# Patient Record
Sex: MALE | Race: WHITE | Employment: FULL TIME | URBAN - METROPOLITAN AREA
[De-identification: names, ages, dates, MRNs, and addresses within clinical notes are randomized per-mention and may not be internally consistent; named-entity substitution may affect disease eponyms.]

---

## 2018-11-03 ENCOUNTER — HOSPITAL ENCOUNTER (EMERGENCY)
Facility: CLINIC | Age: 28
Discharge: HOME | End: 2018-11-04
Attending: EMERGENCY MEDICINE | Admitting: EMERGENCY MEDICINE
Payer: COMMERCIAL

## 2018-11-03 ENCOUNTER — APPOINTMENT (OUTPATIENT)
Dept: GENERAL RADIOLOGY | Facility: CLINIC | Age: 28
End: 2018-11-03
Attending: EMERGENCY MEDICINE
Payer: COMMERCIAL

## 2018-11-03 DIAGNOSIS — I47.19 ATRIAL TACHYCARDIA (H): ICD-10-CM

## 2018-11-03 DIAGNOSIS — E86.0 DEHYDRATION: ICD-10-CM

## 2018-11-03 LAB
ALBUMIN SERPL-MCNC: 4.1 G/DL (ref 3.4–5)
ALP SERPL-CCNC: 105 U/L (ref 40–150)
ALT SERPL W P-5'-P-CCNC: 76 U/L (ref 0–70)
ANION GAP SERPL CALCULATED.3IONS-SCNC: 9 MMOL/L (ref 3–14)
AST SERPL W P-5'-P-CCNC: 54 U/L (ref 0–45)
BASOPHILS # BLD AUTO: 0.1 10E9/L (ref 0–0.2)
BASOPHILS NFR BLD AUTO: 0.7 %
BILIRUB SERPL-MCNC: 0.4 MG/DL (ref 0.2–1.3)
BUN SERPL-MCNC: 13 MG/DL (ref 7–30)
CALCIUM SERPL-MCNC: 9.6 MG/DL (ref 8.5–10.1)
CHLORIDE SERPL-SCNC: 105 MMOL/L (ref 94–109)
CO2 SERPL-SCNC: 24 MMOL/L (ref 20–32)
CREAT SERPL-MCNC: 0.85 MG/DL (ref 0.66–1.25)
DIFFERENTIAL METHOD BLD: NORMAL
EOSINOPHIL # BLD AUTO: 0.2 10E9/L (ref 0–0.7)
EOSINOPHIL NFR BLD AUTO: 2.6 %
ERYTHROCYTE [DISTWIDTH] IN BLOOD BY AUTOMATED COUNT: 13.1 % (ref 10–15)
GFR SERPL CREATININE-BSD FRML MDRD: >90 ML/MIN/1.7M2
GLUCOSE SERPL-MCNC: 149 MG/DL (ref 70–99)
HCT VFR BLD AUTO: 44.7 % (ref 40–53)
HGB BLD-MCNC: 15.3 G/DL (ref 13.3–17.7)
IMM GRANULOCYTES # BLD: 0 10E9/L (ref 0–0.4)
IMM GRANULOCYTES NFR BLD: 0.5 %
LYMPHOCYTES # BLD AUTO: 2.7 10E9/L (ref 0.8–5.3)
LYMPHOCYTES NFR BLD AUTO: 31.6 %
MCH RBC QN AUTO: 30.5 PG (ref 26.5–33)
MCHC RBC AUTO-ENTMCNC: 34.2 G/DL (ref 31.5–36.5)
MCV RBC AUTO: 89 FL (ref 78–100)
MONOCYTES # BLD AUTO: 0.5 10E9/L (ref 0–1.3)
MONOCYTES NFR BLD AUTO: 6 %
NEUTROPHILS # BLD AUTO: 5 10E9/L (ref 1.6–8.3)
NEUTROPHILS NFR BLD AUTO: 58.6 %
NRBC # BLD AUTO: 0 10*3/UL
NRBC BLD AUTO-RTO: 0 /100
PLATELET # BLD AUTO: 246 10E9/L (ref 150–450)
POTASSIUM SERPL-SCNC: 3.6 MMOL/L (ref 3.4–5.3)
PROT SERPL-MCNC: 8.5 G/DL (ref 6.8–8.8)
RBC # BLD AUTO: 5.02 10E12/L (ref 4.4–5.9)
SODIUM SERPL-SCNC: 139 MMOL/L (ref 133–144)
TROPONIN I SERPL-MCNC: <0.015 UG/L (ref 0–0.04)
WBC # BLD AUTO: 8.6 10E9/L (ref 4–11)

## 2018-11-03 PROCEDURE — 93010 ELECTROCARDIOGRAM REPORT: CPT | Mod: Z6 | Performed by: EMERGENCY MEDICINE

## 2018-11-03 PROCEDURE — 84443 ASSAY THYROID STIM HORMONE: CPT | Performed by: EMERGENCY MEDICINE

## 2018-11-03 PROCEDURE — 80053 COMPREHEN METABOLIC PANEL: CPT | Performed by: EMERGENCY MEDICINE

## 2018-11-03 PROCEDURE — 71046 X-RAY EXAM CHEST 2 VIEWS: CPT

## 2018-11-03 PROCEDURE — 99285 EMERGENCY DEPT VISIT HI MDM: CPT | Mod: 25 | Performed by: EMERGENCY MEDICINE

## 2018-11-03 PROCEDURE — 25000128 H RX IP 250 OP 636: Performed by: EMERGENCY MEDICINE

## 2018-11-03 PROCEDURE — 84484 ASSAY OF TROPONIN QUANT: CPT | Performed by: EMERGENCY MEDICINE

## 2018-11-03 PROCEDURE — 96360 HYDRATION IV INFUSION INIT: CPT | Performed by: EMERGENCY MEDICINE

## 2018-11-03 PROCEDURE — 85379 FIBRIN DEGRADATION QUANT: CPT | Performed by: EMERGENCY MEDICINE

## 2018-11-03 PROCEDURE — 85025 COMPLETE CBC W/AUTO DIFF WBC: CPT | Performed by: EMERGENCY MEDICINE

## 2018-11-03 PROCEDURE — 96361 HYDRATE IV INFUSION ADD-ON: CPT | Performed by: EMERGENCY MEDICINE

## 2018-11-03 PROCEDURE — 93005 ELECTROCARDIOGRAM TRACING: CPT | Performed by: EMERGENCY MEDICINE

## 2018-11-03 RX ORDER — SODIUM CHLORIDE 9 MG/ML
1000 INJECTION, SOLUTION INTRAVENOUS CONTINUOUS
Status: DISCONTINUED | OUTPATIENT
Start: 2018-11-03 | End: 2018-11-04 | Stop reason: HOSPADM

## 2018-11-03 RX ADMIN — SODIUM CHLORIDE 1000 ML: 9 INJECTION, SOLUTION INTRAVENOUS at 23:00

## 2018-11-03 ASSESSMENT — ENCOUNTER SYMPTOMS
ARTHRALGIAS: 0
LIGHT-HEADEDNESS: 1
CONFUSION: 0
BACK PAIN: 0
HEADACHES: 0
FEVER: 0
NECK PAIN: 0
DIFFICULTY URINATING: 0
NECK STIFFNESS: 0
SHORTNESS OF BREATH: 0
ABDOMINAL PAIN: 0
NUMBNESS: 1
EYE REDNESS: 0
PALPITATIONS: 0
COLOR CHANGE: 0

## 2018-11-03 NOTE — ED AVS SNAPSHOT
[facility], Emergency Department    [address] [address]    Phone:  [phone]                                       Westley Kang   MRN: [id number]    Department:  [facility], Emergency Department   Date of Visit:  11/3/2018           Patient Information     Date Of Birth          1990        Your diagnoses for this visit were:     Dehydration     Atrial tachycardia (H)        You were seen by Jaden Abebe MD.        Discharge Instructions       Please make an appointment to follow [facility] with [facility] (phone: [phone] as soon as possible for recheck of your blood pressure.    Return if you feel light headed, chest pain, shortness of breath or other new symptoms.      24 Hour Appointment Hotline       To make an appointment at any [facility] clinic, call [phone] ([phone]). If you don't have a family doctor or clinic, we will help you find one. [facility] clinics are conveniently located to serve the needs of you and your family.             Review of your medicines      Notice     You have not been prescribed any medications.            Procedures and tests performed during your visit     Procedure/Test Number of Times Performed    CBC with platelets differential 1    Comprehensive metabolic panel 1    D dimer quantitative 1    EKG 12-lead, tracing only 2    Peripheral IV catheter 1    TSH 1    Troponin I 1    XR Chest 2 Views 1      Orders Needing Specimen Collection     None      Pending Results     Date and Time Order Name Status Description    11/4/2018 0053 EKG 12-lead, tracing only Preliminary     11/3/2018 2254 XR Chest 2 Views Preliminary     11/3/2018 2229 EKG 12-lead, tracing only Preliminary             Pending Culture Results     No orders found for last 3 day(s).            Pending Results Instructions     If you had any lab results that were not finalized at the time of your Discharge, you can call the ED Lab Result RN at  "[phone]. You will be contacted by this team for any positive Lab results or changes in treatment. The nurses are available 7 days a week from 10A to 6:30P.  You can leave a message 24 hours per day and they will return your call.        Thank you for choosing [facility]       Thank you for choosing [facility] for your care. Our goal is always to provide you with excellent care. Hearing back from our patients is one way we can continue to improve our services. Please take a few minutes to complete the written survey that you may receive in the mail after you visit with us. Thank you!        [employer] Information     [employer] lets you send messages to your doctor, view your test results, renew your prescriptions, schedule appointments and more. To sign up, go to [email or web address] . Click on \"Log in\" on the left side of the screen, which will take you to the Welcome page. Then click on \"Sign up Now\" on the right side of the page.     You will be asked to enter the access code listed below, as well as some personal information. Please follow the directions to create your username and password.     Your access code is: [email or web address]  Expires: 2019  1:54 AM     Your access code will  in 90 days. If you need help or a new code, please call your [facility] clinic or [phone].        Care EveryWhere ID     This is your Care EveryWhere ID. This could be used by other organizations to access your [facility] medical records  [id number]        Equal Access to Services     JLUIETA EVANS : Hadii nolberto mercadoo Sogisell, waaxda luqadaha, qaybta kaalmada sophie saez . So [facility] [phone].    ATENCIÓN: Si habla español, tiene a corbin disposición servicios gratuitos de asistencia lingüística. Llame al [phone].    We comply with applicable federal civil rights laws and [place] laws. We do not discriminate on the basis of race, color, national origin, age, disability, sex, " sexual orientation, or gender identity.            After Visit Summary       This is your record. Keep this with you and show to your community pharmacist(s) and doctor(s) at your next visit.

## 2018-11-03 NOTE — ED AVS SNAPSHOT
[facility], [place], Emergency Department    [address] [address]    Phone:  [phone]                                       Westley Kang   MRN: [id number]    Department:  [facility], [place], Emergency Department   Date of Visit:  11/3/2018           After Visit Summary Signature Page     I have received my discharge instructions, and my questions have been answered. I have discussed any challenges I see with this plan with the nurse or doctor.    ..........................................................................................................................................  Patient/Patient Representative Signature      ..........................................................................................................................................  Patient Representative Print Name and Relationship to Patient    ..................................................               ................................................  Date                                   Time    ..........................................................................................................................................  Reviewed by Signature/Title    ...................................................              ..............................................  Date                                               Time          22EPIC Rev 08/18

## 2018-11-04 VITALS
OXYGEN SATURATION: 95 % | TEMPERATURE: 97.7 F | HEIGHT: 75 IN | SYSTOLIC BLOOD PRESSURE: 138 MMHG | WEIGHT: 250.2 LBS | DIASTOLIC BLOOD PRESSURE: 97 MMHG | HEART RATE: 122 BPM | RESPIRATION RATE: 16 BRPM | BODY MASS INDEX: 31.11 KG/M2

## 2018-11-04 LAB
D DIMER PPP FEU-MCNC: 0.4 UG/ML FEU (ref 0–0.5)
INTERPRETATION ECG - MUSE: NORMAL
INTERPRETATION ECG - MUSE: NORMAL
TSH SERPL DL<=0.005 MIU/L-ACNC: 2.13 MU/L (ref 0.4–4)

## 2018-11-04 PROCEDURE — 96361 HYDRATE IV INFUSION ADD-ON: CPT | Performed by: EMERGENCY MEDICINE

## 2018-11-04 PROCEDURE — 93005 ELECTROCARDIOGRAM TRACING: CPT | Performed by: EMERGENCY MEDICINE

## 2018-11-04 PROCEDURE — 93010 ELECTROCARDIOGRAM REPORT: CPT | Mod: 76 | Performed by: EMERGENCY MEDICINE

## 2018-11-04 PROCEDURE — 25000128 H RX IP 250 OP 636: Performed by: EMERGENCY MEDICINE

## 2018-11-04 RX ADMIN — SODIUM CHLORIDE 1000 ML: 9 INJECTION, SOLUTION INTRAVENOUS at 00:39

## 2018-11-04 NOTE — DISCHARGE INSTRUCTIONS
Please make an appointment to follow up with [facility] (phone: [phone] as soon as possible for recheck of your blood pressure.    Return if you feel light headed, chest pain, shortness of breath or other new symptoms.

## 2018-11-04 NOTE — ED TRIAGE NOTES
Presents ambulatory to triage with lightheadedness persisting all day, no syncope, approximately 1 hour ago face began to feel numb. On assessment HTN and tachycardic

## 2018-11-04 NOTE — ED PROVIDER NOTES
History     Chief Complaint   Patient presents with     Dizziness     Numbness     facial     HPI  Westley Kang is a 28 year old male who presents to the Emergency Department for the evaluation of lightheadedness and left face numbness. Patient states that this afternoon while walking in the grocery store, he had lightheadedness and so he went home and rested and ate food and drank water with no change in lightheadedness. Patient states that left facial numbness followed this after getting home and one hour prior to arrival to the ED. Patient states his lightheadedness is ongoing here in the ED and worsens with movement and that his left face numbness is improving. Patient denies chest pain, shortness of breath, palpitation, denies tingling of throat, denies neck or back pain. Patient denies eating any new foods today; he ate chicken shawarma, ice cream, and stir fried vegetables. Patient denies any recent illness or fever. He recently came back from vacation in [place] 1.5 weeks ago and does note mosquito bites on his lower legs. He admits to smoking marijuana 1.5 weeks ago though denies any other drug use. Reports a paternal family history of heart disease with his dad having a triple coronary bypass.     He has no history of leg swelling, no shortness of breath or any sense that his heart rate is fast.  He does have a history of questionable high blood pressure when checked at work.  The patient initially denied all drug use but when asked on a secondary history he did admit to drinking 5 mixed drinks yesterday evening.  He states that he does this on weekend nights and on a few weekday nights.  He does not feel that he ever has shakiness when he stops drinking or skips a day.     I have reviewed the Medications, Allergies, Past Medical and Surgical History, and Social History in the [email or web address] system.  History reviewed. No pertinent past medical history.    Past Surgical History:   Procedure Laterality Date      "GI SURGERY  1989    Chris Fundiplication     ORTHOPEDIC SURGERY      Right elbow       No family history on file.    Social History   Substance Use Topics     Smoking status: Never Smoker     Smokeless tobacco: Never Used     Alcohol use 3.0 oz/week     5 Glasses of wine per week      Comment: daily       No current facility-administered medications for this encounter.      No current outpatient prescriptions on file.      No Known Allergies    Review of Systems   Constitutional: Negative for fever.   HENT: Negative for congestion.    Eyes: Negative for redness.   Respiratory: Negative for shortness of breath.    Cardiovascular: Negative for chest pain and palpitations.   Gastrointestinal: Negative for abdominal pain.   Genitourinary: Negative for difficulty urinating.   Musculoskeletal: Negative for arthralgias, back pain, neck pain and neck stiffness.   Skin: Negative for color change.   Neurological: Positive for light-headedness and numbness (left face). Negative for headaches.   Psychiatric/Behavioral: Negative for confusion.       Physical Exam   BP: (!) 187/120  Pulse: 122  Heart Rate: 137  Temp: 97.7  F (36.5  C)  Resp: 18  Height: 190.5 cm (6' 3\")  Weight: 113.5 kg (250 lb 3.2 oz)  SpO2: 100 %      Physical Exam   Constitutional: He is oriented to person, place, and time. No distress.   HENT:   Head: Atraumatic.   Mouth/Throat: Oropharynx is clear and moist. No oropharyngeal exudate.   Eyes: Pupils are equal, round, and reactive to light. No scleral icterus.   Neck: Neck supple.   Cardiovascular: Regular rhythm, normal heart sounds and intact distal pulses.  Tachycardia present.    No murmur heard.  Pulmonary/Chest: Breath sounds normal. No respiratory distress. He has no wheezes. He has no rales.   Abdominal: Soft. Bowel sounds are normal. There is no tenderness.   Musculoskeletal: He exhibits no edema or tenderness.   Neurological: He is alert and oriented to person, place, and time. He has normal " strength. No sensory deficit. GCS eye subscore is 4. GCS verbal subscore is 5. GCS motor subscore is 6.   Subjective slight difference to light touch on the left and right face.  No other cranial nerve deficit   Skin: Skin is warm. No rash noted. He is not diaphoretic.       ED Course   10:45 PM  The patient was seen and examined by Hayes Abebe MD in Room 15.     ED Course     Procedures             EKG Interpretation:      Interpreted by Jaden Abebe  Time reviewed: 2235  Symptoms at time of EKG: Lightheaded  Rhythm: Sinus tachycardia  Rate: 134  Axis: normal  Ectopy: none  Conduction: normal  ST Segments/ T Waves: No ST-T wave changes  Q Waves: none  Comparison to prior: No old EKG available    Clinical Impression: Sinus tachycardia    Second ECG:       EKG Interpretation:      Interpreted by Jaden Abebe  Time reviewed: 1 AM  Symptoms at time of EKG: Resolving lightheadedness  Rhythm: normal sinus   Rate: 113  Axis: NORMAL  Ectopy: none  Conduction: normal  ST Segments/ T Waves: No ST-T wave changes  Q Waves: none  Comparison to prior: Improved sinus tachycardia    Clinical Impression:  sinus tachycardia        Critical Care time:  none             Labs Ordered and Resulted from Time of ED Arrival Up to the Time of Departure from the ED   COMPREHENSIVE METABOLIC PANEL - Abnormal; Notable for the following:        Result Value    Glucose 149 (*)     ALT 76 (*)     AST 54 (*)     All other components within normal limits   CBC WITH PLATELETS DIFFERENTIAL   TROPONIN I   D DIMER QUANTITATIVE   TSH   PERIPHERAL IV CATHETER            Assessments & Plan (with Medical Decision Making)   The patient had tachycardia and hypertension on arrival.  He stated that he felt lightheaded.  Blood pressure recheck 5 minutes after entering the room when the patient stated he was feeling less anxious improved considerably to 150/100.  He states that he does have some high blood pressure found on work monitor  but has not been on medication.  Was more concerning is the persistent tachycardia.  He denies any symptoms of allergic reaction, use of stimulants, history of thyroid problems, fever or signs of infection, shortness of breath or other cardiopulmonary symptoms.  A d-dimer was negative.  His white blood cell count, hemoglobin and electrolytes are all normal.  He did have a slight elevation in transaminases consistent with the alcohol he consumed yesterday.  He was given a liter bolus of normal saline which brought his heart rate down to 110 although it did continue to rise when he stood up.  A second liter bolus had a more persistent effect.  The patient feels complete be fine at this point and after being observed for 4 hours prefer to go home over my offer to observe him in the hospital.  I did perform a bedside limited echocardiogram but the images were not archived as they were suboptimal.  Second look with Dr. SAAD Kendall revealed better images with no sign of pericardial effusion, normal symmetric wall motion and normal movement of the mitral and tricuspid valves.  The patient's chest x-ray was normal with no cardiomegaly and no abnormalities in the lung fields.  The patient's facial numbness had completely resolved and was more consistent with some hyperventilation on arrival.  He had no focal neurologic deficits on recheck.  Given the lack of confusion, lack of chest pain and normal troponin I do not think he has a myocarditis.  He has no fever or body aches to suggest malaria.  A TSH was normal.  He does have some persistent tachycardia and a repeat EKG does not show any ischemic changes but does better demonstrate P waves and lack of other arrhythmia.  The patient may have an intermittent atrial tachycardia or reaction to something that he ate versus dehydration from alcohol consumption last night.  An order was placed to arrange follow-up with cardiology as he likely has some underlying hypertension as well as  needed for reevaluation of his tachycardia.  I discussed this with the patient and he agrees to follow-up and was given the phone number and alerted that the cardiology clinic should be calling in the next few days.    I have reviewed the nursing notes.    I have reviewed the findings, diagnosis, plan and need for follow up with the patient.    There are no discharge medications for this patient.      Final diagnoses:   Dehydration   Atrial tachycardia (H)     I, Peng Hawley, am serving as a trained medical scribe to document services personally performed by Hayes Abebe MD, based on the provider's statements to me.   I, Hayes Abebe MD, was physically present and have reviewed and verified the accuracy of this note documented by Peng Hawley.    11/3/2018   [facility], [place], EMERGENCY DEPARTMENT     Jaden Abebe MD  11/04/18 1159

## 2018-11-12 ASSESSMENT — ENCOUNTER SYMPTOMS
EYE IRRITATION: 0
SYNCOPE: 0
HYPOTENSION: 0
EXERCISE INTOLERANCE: 0
HYPERTENSION: 1
ORTHOPNEA: 0
EYE WATERING: 0
EYE PAIN: 0
DOUBLE VISION: 1
SLEEP DISTURBANCES DUE TO BREATHING: 0
LIGHT-HEADEDNESS: 1
PALPITATIONS: 0
EYE REDNESS: 0
LEG PAIN: 0

## 2018-11-13 ENCOUNTER — OFFICE VISIT (OUTPATIENT)
Dept: INTERNAL MEDICINE | Facility: CLINIC | Age: 28
End: 2018-11-13
Payer: COMMERCIAL

## 2018-11-13 VITALS
OXYGEN SATURATION: 99 % | BODY MASS INDEX: 31.42 KG/M2 | WEIGHT: 252.7 LBS | SYSTOLIC BLOOD PRESSURE: 139 MMHG | DIASTOLIC BLOOD PRESSURE: 90 MMHG | HEIGHT: 75 IN | HEART RATE: 100 BPM

## 2018-11-13 DIAGNOSIS — R74.8 ELEVATED LIVER ENZYMES: ICD-10-CM

## 2018-11-13 DIAGNOSIS — I10 HYPERTENSION, UNSPECIFIED TYPE: ICD-10-CM

## 2018-11-13 DIAGNOSIS — R55 PRE-SYNCOPE: ICD-10-CM

## 2018-11-13 DIAGNOSIS — R42 LIGHTHEADEDNESS: Primary | ICD-10-CM

## 2018-11-13 ASSESSMENT — ENCOUNTER SYMPTOMS
TINGLING: 0
DYSPNEA ON EXERTION: 0
POSTURAL DYSPNEA: 0
SPEECH CHANGE: 0
PANIC: 0
SPUTUM PRODUCTION: 0
SMELL DISTURBANCE: 0
SEIZURES: 0
NERVOUS/ANXIOUS: 0
EYE REDNESS: 0
BLOATING: 0
RECTAL PAIN: 0
INSOMNIA: 0
TASTE DISTURBANCE: 0
CHILLS: 0
TREMORS: 0
EYE WATERING: 0
DYSURIA: 0
SNORES LOUDLY: 0
DISTURBANCES IN COORDINATION: 0
RECTAL BLEEDING: 0
DECREASED CONCENTRATION: 0
RESPIRATORY PAIN: 0
MYALGIAS: 0
HEMATURIA: 0
SORE THROAT: 0
SINUS PAIN: 0
DIZZINESS: 0
MUSCLE WEAKNESS: 0
ORTHOPNEA: 0
SYNCOPE: 0
NAIL CHANGES: 0
DOUBLE VISION: 1
INCREASED ENERGY: 0
MUSCLE CRAMPS: 0
LIGHT-HEADEDNESS: 1
POLYDIPSIA: 0
WHEEZING: 0
WEIGHT LOSS: 0
COUGH: 0
BLOOD IN STOOL: 0
SHORTNESS OF BREATH: 0
WEAKNESS: 0
NECK PAIN: 0
SWOLLEN GLANDS: 0
VOMITING: 0
HALLUCINATIONS: 0
PARALYSIS: 0
CONSTIPATION: 0
HYPOTENSION: 0
HOARSE VOICE: 0
DIARRHEA: 0
FLANK PAIN: 0
SKIN CHANGES: 0
POLYPHAGIA: 0
JAUNDICE: 0
MEMORY LOSS: 0
LEG PAIN: 0
SLEEP DISTURBANCES DUE TO BREATHING: 0
SINUS CONGESTION: 0
NAUSEA: 0
HYPERTENSION: 1
FEVER: 0
EYE IRRITATION: 0
PALPITATIONS: 0
BOWEL INCONTINENCE: 0
FATIGUE: 0
BRUISES/BLEEDS EASILY: 0
DIFFICULTY URINATING: 0
COUGH DISTURBING SLEEP: 0
EYE PAIN: 0
EXERCISE INTOLERANCE: 0
NECK MASS: 0
ARTHRALGIAS: 0
NIGHT SWEATS: 0
HEMOPTYSIS: 0
TROUBLE SWALLOWING: 0
POOR WOUND HEALING: 0
DEPRESSION: 0
BACK PAIN: 0
NUMBNESS: 0
JOINT SWELLING: 0
ABDOMINAL PAIN: 0
WEIGHT GAIN: 0
EXTREMITY NUMBNESS: 0
DECREASED APPETITE: 0
ALTERED TEMPERATURE REGULATION: 0
HEADACHES: 0
LOSS OF CONSCIOUSNESS: 0
STIFFNESS: 0
HEARTBURN: 0

## 2018-11-13 ASSESSMENT — PAIN SCALES - GENERAL: PAINLEVEL: NO PAIN (0)

## 2018-11-13 NOTE — NURSING NOTE
Chief Complaint   Patient presents with     Establish Care     Was in ED recently for elevated HR and B/P.       Kristen Rizzo LPN at 7:31 AM on November 13, 2018

## 2018-11-13 NOTE — PATIENT INSTRUCTIONS
[facility]         Internal Medicine Resident                   Continuity Clinic    Who We Are    Resident Continuity Clinic is a part of the [facility] Primary [facility].  Resident physicians see patients independently and establish a relationship with them over the course of their three-year residency program.  As with the Primary Care Clinic, our Resident Continuity Clinic models a group practice.  If your doctor is not available, you will be able to see another resident physician.  At the end of a resident s training, patients will be transitioned to a new resident physician for ongoing care.     We treat patients with a wide array of medical needs from routine physicals, to acute illnesses, to diabetes and blood pressure management, to complex medical illness.  What is a Resident Physician?    Resident physicians hold medical degrees and are doctors. They are training to become specialists in Internal Medicine. They work under the supervision of board-certified faculty physicians.  Expectations for Your Care    We strive to provide accessible, quality care at all times.    In order to provide this care, it is best to see your primary care resident doctor consistently rather switching between providers.  In the event you do see another physician, you should schedule a follow-up visit with your usual primary care doctor.    If you are transitioning your care from another clinic, it is helpful to have your records available for your doctor to review.    We do not prescribe controlled substances, such as ADD medications or narcotic pain medications, on your first visit.  We will review your health records and concerns prior to devising a treatment plan with you in order to provide the best care.      Clinic Services     Extended clinic hours; patient  to help navigate your visit;  parking; laboratory and imaging services with evening and weekend hours    Multiple medical and  surgical specialties in one building    Complementary services, including Nutrition, Integrative Medicine, Pharmacy consultations, Mental and Behavioral Health, Sports Medicine and Physical Therapy    Thank You    We would like to thank you for choosing the [facility] Internal Medicine Resident Continuity Clinic for your primary care. You are making a priceless contribution to the training of the next generation of health care practitioners.     Contact us at [phone] for appointments or questions.    Resident Clinic Hours are Tuesdays and Thursdays, 7:30am-5:00pm    Residents   Real Nash MD   (Male)   Mary Lou Daniel MD  (Female)  Danielle Varela MD   (Female)   Gayle Castaneda MD   (Female)   Harvinder Arroyo MD  (Male)   Peng Crooks MD  (Male)   Carlos Alberto Price MD    (Female)   Korey De La Rosa MD  (Male)   Demetrius Clement MD  (Male)    Arlet Uribe MD  (Female)   Hakeem Mendenhall MD  (Male)   Adis Grady MD  (Female)   Sandra Weinberg MD    (Female)   Brody Batista MD  (Male)   Amadou Mendes MD  (Male)   Peng Montejo MD (Male)   Marquis Jonas MD  (Male)   Gabriella Munguia MD (Female)    Desiree Saba MD (Female)   Frederick Shaikh MD  (Male)   Rosa Dixon MD    (Female)   Sherice Osei MD  (Female)    Supervising Physicians   MD Denisse Franco MD Briar Duffy, MD James Langland, MD Mary Logeais, MD Tanya Melnik, MD Charles Moldow, MD Heather Thompson Buum, MD Kathleen Watson, MD

## 2018-11-13 NOTE — PROGRESS NOTES
PRIMARY CARE CENTER         HPI:       Westley Kang is a 28 year old male who presents for the following  Patient presents with: [facility] (Was in ED recently for elevated HR and B/P.)    Patient is a 29 YO M with no prior significant medical history presenting to [facility] and for ED follow-up. Patient presented to ED on November 3 with lightheadedness, blurry vision, followed by left-sided facial numbness. He denies any HA, aura, further numbness or weakness, dysarthria, or aphasia. Further denies CP/tightness, palpitations, SOB. No fever, chills, N/V. Workup in ED included negative troponins, EKG with sinus tachycardia, negative d-dimer. TSH within normal limits. LFTs mildly elevated.    He endorses similar episodes weekly which he describes as lightheadedness that lasts for several hours and is somewhat relieved by rest and PO hydration. He does not take any medications . Has not been seen by a primary care clinic in years. Family history is significant for CAD in his father.    Problem, Medication and Allergy Lists were reviewed and are current.  Patient is a new patient to this clinic and so  I reviewed/updated the Past Medical History, the Family History and the Social History.     Family History   Problem Relation Age of Onset     Lymphoma Mother      Coronary Artery Disease Father      Hypertension Father      Diabetes Father      Hypertension Brother             Review of Systems:   Review of Systems     Constitutional:  Negative for fever, chills, weight loss, weight gain, fatigue, decreased appetite, night sweats, recent stressors, height gain, height loss, post-operative complications, incisional pain, hallucinations, increased energy, hyperactivity and confused.   HENT:  Negative for ear pain, hearing loss, tinnitus, nosebleeds, trouble swallowing, hoarse voice, mouth sores, sore throat, ear discharge, tooth pain, gum tenderness, taste disturbance, smell disturbance, hearing aid,  bleeding gums, dry mouth, sinus pain, sinus congestion and neck mass.    Eyes:  Positive for double vision. Negative for pain, redness, eye pain, decreased vision, eye watering, eye bulging, eye dryness, flashing lights, spots, floaters, strabismus, tunnel vision, jaundice and eye irritation.   Respiratory:   Negative for cough, hemoptysis, sputum production, shortness of breath, wheezing, sleep disturbances due to breathing, snores loudly, respiratory pain, dyspnea on exertion, cough disturbing sleep and postural dyspnea.    Cardiovascular:  Positive for hypertension and light-headedness. Negative for chest pain, dyspnea on exertion, palpitations, orthopnea, fingers/toes turn blue, hypotension, syncope, history of heart murmur, pacemaker, few scattered varicosities, leg pain, sleep disturbances due to breathing, exercise intolerance and edema.   Gastrointestinal:  Negative for heartburn, nausea, vomiting, abdominal pain, diarrhea, constipation, blood in stool, melena, rectal pain, bloating, hemorrhoids, bowel incontinence, jaundice, rectal bleeding, coffee ground emesis and change in stool.   Genitourinary:  Negative for bladder incontinence, dysuria, urgency, hematuria, flank pain, difficulty urinating, nocturia, voiding less frequently, scrotal pain, ulcerations, penile discharge, male genitourinary complaint and reduced libido.   Musculoskeletal:  Negative for myalgias, back pain, joint swelling, arthralgias, stiffness, muscle cramps, neck pain, bone pain, muscle weakness and fracture.   Skin:  Negative for nail changes, itching, poor wound healing, rash, hair changes, skin changes, acne, warts, poor wound healing, scarring, flaky skin, Raynaud's phenomenon, sensitivity to sunlight and skin thickening.   Neurological:  Positive for light-headedness. Negative for dizziness, tingling, tremors, speech change, seizures, loss of consciousness, weakness, numbness, headaches, disturbances in coordination, extremity  "numbness, memory loss, difficulty walking and paralysis.   Endo/Heme:  Negative for anemia, swollen glands and bruises/bleeds easily.   Psychiatric/Behavioral:  Negative for depression, hallucinations, memory loss, decreased concentration, mood swings and panic attacks.    Endocrine:  Negative for altered temperature regulation, polyphagia, polydipsia, unwanted hair growth and change in facial hair.    I have personally reviewed the ROS on the day of the visit.           Physical Exam:   /90  Pulse 100  Ht 1.905 m (6' 3\")  Wt 114.6 kg (252 lb 11.2 oz)  SpO2 99%  BMI 31.59 kg/m2  Body mass index is 31.59 kg/(m^2).  Vitals were reviewed       GENERAL APPEARANCE: healthy, alert and no distress     EYES: EOMI,  PERRL     HENT: ear canals and TM's normal and nose and mouth without ulcers or lesions     NECK: no adenopathy, no asymmetry, masses, or scars and thyroid normal to palpation     RESP: lungs clear to auscultation - no rales, rhonchi or wheezes     CV: regular rates and rhythm, normal S1 S2, no S3 or S4 and no murmur, click or rub, no JVD     ABDOMEN:  soft, nontender, no HSM or masses and bowel sounds normal     MS: extremities normal- no gross deformities noted, no evidence of inflammation in joints, FROM in all extremities. No peripheral edema     SKIN: no suspicious lesions or rashes     NEURO: No gross focal deficits, cranial nerves grossly in tact, mentation intact and speech normal, no tremor     PSYCH: mentation appears normal and affect normal     LYMPHATICS: No cervical, occipital, or supraclavicular adenopathy        Results:      Results from the last 24 hoursNo results found for this or any previous visit (from the past 24 hour(s)).  Assessment and Plan     Westley was seen today to establish care and for post- ER follow-up. Diagnoses and all orders for this visit:    # Lightheadedness  # Pre-syncope  Differential remains broad and includes supraventricular tachycardias, PVC/PAC, with other " arrhythmias being less likely in setting of young age and prior EKG findings. EKG from 11/3 reviewed. Troponin 11/3 negative x1. D-dimer negative. CXR within normal limits. Physical exam significant for mild tachycardia without appreciable murmurs. TSH 2.13 on 11/3. Electrolytes within normal limits.  -     Zio Patch Holter; Future    # Hypertension, unspecified type  Persistently elevated blood pressure over the past several weeks. Discussed monitoring blood pressure until follow-up and following a heart healthy diet. Will defer pharmacologic therapy for now to be readdressed on follow-up.  -     Lipid Profile FASTING; Future  -     Hemoglobin A1c; Future    # Elevated liver enzymes  AST 55, ALT 76.  Patient does not respond to alcohol, however, current medications are inconsistent from chronic alcohol use.  Will obtain repeat CMP and continue to monitor.  -     Comprehensive metabolic panel; Future    Options for treatment and follow-up care were reviewed with the patient. Westley Kang engaged in the decision making process and verbalized understanding of the options discussed and agreed with the final plan.    Mary Lou Daniel MD  PGY-1, Internal Medicine  Nov 13, 2018    Pt was seen and plan of care discussed with Dr. Lamb.         Answers for HPI/ROS submitted by the patient on 11/12/2018   PHQ-2 Score: 0      Pt was seen and examined with Dr. Daniel.  I agree with her documentation as noted above.    My additional comments: none    Yarely Lamb MD

## 2018-11-13 NOTE — MR AVS SNAPSHOT
After Visit Summary   11/13/2018    Westley Kang    MRN: [id number]           Patient Information     Date Of Birth          1990        Visit Information        Provider Department      11/13/2018 7:10 AM Mary Lou Daniel MD [facility]        Today's Diagnoses     Lightheadedness    -  1    Pre-syncope        Hypertension, unspecified type        Elevated liver enzymes          Care Instructions           [facility]         Internal Medicine Resident                   Continuity Clinic    Who We Are    Resident Continuity Clinic is a part of the [facility] Primary Care Clinic.  Resident physicians see patients independently and establish a relationship with them over the course of their three-year residency program.  As with the Primary Care Clinic, our Resident Continuity Clinic models a group practice.  If your doctor is not available, you will be able to see another resident physician.  At the end of a resident s training, patients will be transitioned to a new resident physician for ongoing care.     We treat patients with a wide array of medical needs from routine physicals, to acute illnesses, to diabetes and blood pressure management, to complex medical illness.  What is a Resident Physician?    Resident physicians hold medical degrees and are doctors. They are training to become specialists in Internal Medicine. They work under the supervision of board-certified faculty physicians.  Expectations for Your Care    We strive to provide accessible, quality care at all times.    In order to provide this care, it is best to see your primary care resident doctor consistently rather switching between providers.  In the event you do see another physician, you should schedule a follow-up visit with your usual primary care doctor.    If you are transitioning your care from another clinic, it is helpful to have your records available for your doctor to  review.    We do not prescribe controlled substances, such as ADD medications or narcotic pain medications, on your first visit.  We will review your health records and concerns prior to devising a treatment plan with you in order to provide the best care.      Clinic Services     Extended clinic hours; patient  to help navigate your visit;  parking; laboratory and imaging services with evening and weekend hours    Multiple medical and surgical specialties in one building    Complementary services, including Nutrition, Integrative Medicine, Pharmacy consultations, Mental and Behavioral Health, Sports Medicine and Physical Therapy    Thank You    We would like to thank you for choosing the [facility] Internal Medicine Resident Continuity Clinic for your primary care. You are making a priceless contribution to the training of the next generation of health care practitioners.     Contact us at [phone] for appointments or questions.    Resident Clinic Hours are Tuesdays and Thursdays, 7:30am-5:00pm    Residents   Real Nash MD   (Male)   Mary Lou Daniel MD  (Female)  Danielle Varela MD   (Female)   Gayle Castaneda MD   (Female)   Harvinder Arroyo MD  (Male)   Peng Crooks MD  (Male)   Carlos Alberto Price MD    (Female)   Korey De La Rosa MD  (Male)   Demetrius Clement MD  (Male)    Arlet Uribe MD  (Female)   Hakeem Mendenhall MD  (Male)   Aids Grady MD  (Female)   Sandra Weinberg MD    (Female)   Brody Batista MD  (Male)   Amadou Mendes MD  (Male)   Peng Montejo MD (Male)   Marquis Jonas MD  (Male)   Gabriella Munguia MD (Female)    Desiree Saba MD (Female)   Frederick Shaikh MD  (Male)   Rosa Dixon MD    (Female)   Sherice Osei MD  (Female)    Supervising Physicians   MD Denisse Franco MD Briar Duffy, MD James Langland, MD Mary Logeais, MD Tanya Melnik, MD Charles Moldow, MD Heather Thompson Buum, MD Kathleen Watson,  MD                    Follow-ups after your visit        Follow-up notes from your care team     Return in about 4 weeks (around 12/11/2018) for BP Recheck, Physical Exam.      Your next 10 appointments already scheduled     Nov 14, 2018  9:00 AM CST   (Arrive by 8:45 AM)   HOLTER MONITOR VISIT with  Cvc Monitor Tech, [facility] ([facility])    [address]  [address]  [place] [id number] [phone]            Nov 14, 2018 10:15 AM CST   LAB with  LAB   [facility] ([facility])    [address]  1st Floor  [place] [place] [facility] [id number] [phone]           Please do not eat 10-12 hours before your appointment if you are coming in fasting for labs on lipids, cholesterol, or glucose (sugar). This does not apply to pregnant women. Water, hot tea and black coffee (with nothing added) are okay. Do not drink other fluids, diet soda or chew gum.            Dec 11, 2018  7:55 AM CST   (Arrive by 7:40 AM)   PHYSICAL with Mary Lou Daniel MD   [facility] ([facility])    [address]  4th Floor  [place] [facility] [id number] [phone]              Future tests that were ordered for you today     Open Future Orders        Priority Expected Expires Ordered    Zio Patch Holter Routine  12/28/2018 11/13/2018    Lipid Profile FASTING Routine 11/13/2018 11/13/2019 11/13/2018    Comprehensive metabolic panel Routine 11/13/2018 11/27/2018 11/13/2018    Hemoglobin A1c Routine 11/13/2018 11/27/2018 11/13/2018            Who to contact     Please call your clinic at [phone] to:    Ask questions about your health    Make or cancel appointments    Discuss your medicines    Learn about your test results    Speak to your doctor            Additional Information About Your Visit        MyChart Information     NPC IIIhart gives you secure access to your electronic health record. If you  "see a primary care provider, you can also send messages to your care team and make appointments. If you have questions, please call your primary care clinic.  If you do not have a primary care provider, please call [phone] and they will assist you.      PartTec is an electronic gateway that provides easy, online access to your medical records. With PartTec, you can request a clinic appointment, read your test results, renew a prescription or communicate with your care team.     To access your existing account, please contact your [facility] or call [phone] for assistance.        Care EveryWhere ID     This is your Care EveryWhere ID. This could be used by other organizations to access your [place] medical records  [id number]        Your Vitals Were     Pulse Height Pulse Oximetry BMI (Body Mass Index)          100 1.905 m (6' 3\") 99% 31.59 kg/m2         Blood Pressure from Last 3 Encounters:   11/13/18 139/90   11/04/18 (!) 138/97    Weight from Last 3 Encounters:   11/13/18 114.6 kg (252 lb 11.2 oz)   11/03/18 113.5 kg (250 lb 3.2 oz)               Primary Care Provider Office Phone # Fax #    Mary Lou Daniel [phone]       [address] [place] [address]        Equal Access to Services     JULIETA EVANS : Hadii aad ku hadasho Soomaali, waaxda luqadaha, qaybta kaalmada adeegyada, sophie dash. So [facility] [phone].    ATENCIÓN: Si habla español, tiene a corbin disposición servicios gratuitos de asistencia lingüística. Llame al [phone].    We comply with applicable federal civil rights laws and [place] laws. We do not discriminate on the basis of race, color, national origin, age, disability, sex, sexual orientation, or gender identity.            Thank you!     Thank you for choosing [facility]  for your care. Our goal is always to provide you with excellent care. Hearing " back from our patients is one way we can continue to improve our services. Please take a few minutes to complete the written survey that you may receive in the mail after your visit with us. Thank you!             Your Updated Medication List - Protect others around you: Learn how to safely use, store and throw away your medicines at www.disposemymeds.org.      Notice  As of 11/13/2018  8:38 AM    You have not been prescribed any medications.

## 2018-11-21 ENCOUNTER — ALLIED HEALTH/NURSE VISIT (OUTPATIENT)
Dept: CARDIOLOGY | Facility: CLINIC | Age: 28
End: 2018-11-21
Payer: COMMERCIAL

## 2018-11-21 DIAGNOSIS — R42 LIGHTHEADEDNESS: ICD-10-CM

## 2018-11-21 DIAGNOSIS — R55 PRE-SYNCOPE: ICD-10-CM

## 2018-11-21 PROCEDURE — 0296T ZIO PATCH HOLTER: CPT | Mod: ZF

## 2018-11-21 PROCEDURE — 0298T ZZC EXT ECG > 48HR TO 21 DAY REVIEW AND INTERPRETATN: CPT | Performed by: INTERNAL MEDICINE

## 2018-11-21 NOTE — NURSING NOTE
Per Dr. Lamb, patient to have 7 days ziopatch monitor placed.  Diagnosis: lightheadedness  Monitor placed: Yes  Patient Instructed: Yes  Patient verbalized understanding: Yes  Holter # [id number]  Placed by: Desiree Stone CMA

## 2018-11-21 NOTE — MR AVS SNAPSHOT
After Visit Summary   11/21/2018    Westley Kang    MRN: [id number]           Patient Information     Date Of Birth          1990        Visit Information        Provider Department      11/21/2018 9:00 AM Tech, [facility] Cvc Monitor, [facility]        Today's Diagnoses     Lightheadedness        Pre-syncope           Follow-ups after your visit        Your next 10 appointments already scheduled     Dec 11, 2018  7:55 AM CST   (Arrive by 7:40 AM)   PHYSICAL with Mary Lou Daniel MD   [facility] ([facility])    [address] [place] [address]  [address] [facility] [address] [phone]              Who to contact     If you have questions or need follow up information about today's clinic visit or your schedule please contact [facility] directly at [phone].  Normal or non-critical lab and imaging results will be communicated to you by Adsamehart, letter or phone within 4 business days after the clinic has received the results. If you do not hear from us within 7 days, please contact the clinic through Adsamehart or phone. If you have a critical or abnormal lab result, we will notify you by phone as soon as possible.  Submit refill requests through C-sam or call your pharmacy and they will forward the refill request to us. Please allow 3 business days for your refill to be completed.          Additional Information About Your Visit        MyChart Information     C-sam gives you secure access to your electronic health record. If you see a primary care provider, you can also send messages to your care team and make appointments. If you have questions, please call your primary care clinic.  If you do not have a primary care provider, please call [phone] and they will assist you.        Care EveryWhere ID     This is your Care EveryWhere ID. This could be used by other organizations to access your [facility]  records  [id number]         Blood Pressure from Last 3 Encounters:   11/13/18 139/90   11/04/18 (!) 138/97    Weight from Last 3 Encounters:   11/13/18 114.6 kg (252 lb 11.2 oz)   11/03/18 113.5 kg (250 lb 3.2 oz)              We Performed the Following     Zio Patch Holter        Primary Care Provider Office Phone # Fax #    Mary Lou Daniel [phone]       [address] [place] [address]        Equal Access to Services     : Hadii aad ku hadasho Sogisell, waaxda luqadaha, qaybta kaalmada adewilder, sophie montero . So [facility] [phone].    ATENCIÓN: Si habla español, tiene a corbin disposición servicios gratuitos de asistencia lingüística. [facility] [phone].    We comply with applicable federal civil rights laws and [place] laws. We do not discriminate on the basis of race, color, national origin, age, disability, sex, sexual orientation, or gender identity.            Thank you!     Thank you for choosing [facility]  for your care. Our goal is always to provide you with excellent care. Hearing back from our patients is one way we can continue to improve our services. Please take a few minutes to complete the written survey that you may receive in the mail after your visit with us. Thank you!             Your Updated Medication List - Protect others around you: Learn how to safely use, store and throw away your medicines at www.disposemymeds.org.      Notice  As of 11/21/2018  4:43 PM    You have not been prescribed any medications.

## 2018-11-21 NOTE — LETTER
Patient:  Westley Kang  :   1990  MRN:     [id number]        Mr. Westley Kang  [address]  [place] [address]        2018    Dear Mr. Kang,    Thank you for choosing the [facility] for your healthcare needs.  We appreciate the opportunity to serve you.    The following are your recent test results.     Westley,     I'm happy to report that the Holter monitor study showed no significant cardiac arrhythmias.  During a period when you reported some symptoms, your heart rate was 103 bpm in a normal sinus rhythm.   As we always find, there were occasions when you have a single ventricular activity, but that is not uncommon and not worrisome.     Lightheadedness is not due to an arrhythmia. Good news.  Please continue to monitor your symptoms, and if they get worse, please return to clinic.     Best wishes,     Antionette Lamb M.D.     Results for orders placed or performed in visit on 18   Zio Patch Holter    [facility]  [address]  [address]  [place] [address] [phone]  2018      Patient:  Westley Kang  Chart: [id number]  :  1990  Age:  28 year old  Sex:  male       Procedure:  ZioPatch Monitor.        Technician performing hook-up:  Desiree Stone

## 2018-12-26 ENCOUNTER — HOSPITAL ENCOUNTER (EMERGENCY)
Facility: CLINIC | Age: 28
Discharge: HOME | End: 2018-12-26
Attending: EMERGENCY MEDICINE | Admitting: EMERGENCY MEDICINE
Payer: COMMERCIAL

## 2018-12-26 VITALS
DIASTOLIC BLOOD PRESSURE: 95 MMHG | HEIGHT: 75 IN | WEIGHT: 245 LBS | RESPIRATION RATE: 18 BRPM | TEMPERATURE: 97.1 F | BODY MASS INDEX: 30.46 KG/M2 | OXYGEN SATURATION: 97 % | SYSTOLIC BLOOD PRESSURE: 175 MMHG | HEART RATE: 116 BPM

## 2018-12-26 DIAGNOSIS — R07.9 CHEST PAIN, UNSPECIFIED TYPE: ICD-10-CM

## 2018-12-26 DIAGNOSIS — M54.2 NECK PAIN: ICD-10-CM

## 2018-12-26 LAB
ALBUMIN SERPL-MCNC: 4.4 G/DL (ref 3.4–5)
ALP SERPL-CCNC: 82 U/L (ref 40–150)
ALT SERPL W P-5'-P-CCNC: 40 U/L (ref 0–70)
ANION GAP SERPL CALCULATED.3IONS-SCNC: 7 MMOL/L (ref 3–14)
AST SERPL W P-5'-P-CCNC: 15 U/L (ref 0–45)
BASOPHILS # BLD AUTO: 0 10E9/L (ref 0–0.2)
BASOPHILS NFR BLD AUTO: 0.3 %
BILIRUB SERPL-MCNC: 0.4 MG/DL (ref 0.2–1.3)
BUN SERPL-MCNC: 13 MG/DL (ref 7–30)
CALCIUM SERPL-MCNC: 9 MG/DL (ref 8.5–10.1)
CHLORIDE SERPL-SCNC: 105 MMOL/L (ref 94–109)
CO2 SERPL-SCNC: 26 MMOL/L (ref 20–32)
CREAT SERPL-MCNC: 0.7 MG/DL (ref 0.66–1.25)
DIFFERENTIAL METHOD BLD: NORMAL
EOSINOPHIL # BLD AUTO: 0.2 10E9/L (ref 0–0.7)
EOSINOPHIL NFR BLD AUTO: 2.5 %
ERYTHROCYTE [DISTWIDTH] IN BLOOD BY AUTOMATED COUNT: 13.1 % (ref 10–15)
ETHANOL SERPL-MCNC: <0.01 G/DL
GFR SERPL CREATININE-BSD FRML MDRD: >90 ML/MIN/{1.73_M2}
GLUCOSE SERPL-MCNC: 88 MG/DL (ref 70–99)
HCT VFR BLD AUTO: 46.5 % (ref 40–53)
HGB BLD-MCNC: 15.4 G/DL (ref 13.3–17.7)
IMM GRANULOCYTES # BLD: 0 10E9/L (ref 0–0.4)
IMM GRANULOCYTES NFR BLD: 0.4 %
INTERPRETATION ECG - MUSE: NORMAL
LIPASE SERPL-CCNC: 96 U/L (ref 73–393)
LYMPHOCYTES # BLD AUTO: 2.3 10E9/L (ref 0.8–5.3)
LYMPHOCYTES NFR BLD AUTO: 24.3 %
MCH RBC QN AUTO: 30 PG (ref 26.5–33)
MCHC RBC AUTO-ENTMCNC: 33.1 G/DL (ref 31.5–36.5)
MCV RBC AUTO: 91 FL (ref 78–100)
MONOCYTES # BLD AUTO: 0.5 10E9/L (ref 0–1.3)
MONOCYTES NFR BLD AUTO: 5.3 %
NEUTROPHILS # BLD AUTO: 6.4 10E9/L (ref 1.6–8.3)
NEUTROPHILS NFR BLD AUTO: 67.2 %
NRBC # BLD AUTO: 0 10*3/UL
NRBC BLD AUTO-RTO: 0 /100
PLATELET # BLD AUTO: 283 10E9/L (ref 150–450)
POTASSIUM SERPL-SCNC: 3.9 MMOL/L (ref 3.4–5.3)
PROT SERPL-MCNC: 8.5 G/DL (ref 6.8–8.8)
RBC # BLD AUTO: 5.14 10E12/L (ref 4.4–5.9)
SODIUM SERPL-SCNC: 138 MMOL/L (ref 133–144)
TROPONIN I SERPL-MCNC: <0.015 UG/L (ref 0–0.04)
WBC # BLD AUTO: 9.6 10E9/L (ref 4–11)

## 2018-12-26 PROCEDURE — 96361 HYDRATE IV INFUSION ADD-ON: CPT

## 2018-12-26 PROCEDURE — 25000128 H RX IP 250 OP 636: Performed by: EMERGENCY MEDICINE

## 2018-12-26 PROCEDURE — 84484 ASSAY OF TROPONIN QUANT: CPT | Performed by: EMERGENCY MEDICINE

## 2018-12-26 PROCEDURE — 85025 COMPLETE CBC W/AUTO DIFF WBC: CPT | Performed by: EMERGENCY MEDICINE

## 2018-12-26 PROCEDURE — 83690 ASSAY OF LIPASE: CPT | Performed by: EMERGENCY MEDICINE

## 2018-12-26 PROCEDURE — 96361 HYDRATE IV INFUSION ADD-ON: CPT | Performed by: EMERGENCY MEDICINE

## 2018-12-26 PROCEDURE — 96360 HYDRATION IV INFUSION INIT: CPT

## 2018-12-26 PROCEDURE — 80320 DRUG SCREEN QUANTALCOHOLS: CPT | Performed by: EMERGENCY MEDICINE

## 2018-12-26 PROCEDURE — 93010 ELECTROCARDIOGRAM REPORT: CPT | Mod: Z6 | Performed by: EMERGENCY MEDICINE

## 2018-12-26 PROCEDURE — 80053 COMPREHEN METABOLIC PANEL: CPT | Performed by: EMERGENCY MEDICINE

## 2018-12-26 PROCEDURE — 99284 EMERGENCY DEPT VISIT MOD MDM: CPT | Mod: 25 | Performed by: EMERGENCY MEDICINE

## 2018-12-26 PROCEDURE — 93005 ELECTROCARDIOGRAM TRACING: CPT | Performed by: EMERGENCY MEDICINE

## 2018-12-26 RX ORDER — SODIUM CHLORIDE 9 MG/ML
1000 INJECTION, SOLUTION INTRAVENOUS CONTINUOUS
Status: DISCONTINUED | OUTPATIENT
Start: 2018-12-26 | End: 2018-12-26 | Stop reason: HOSPADM

## 2018-12-26 RX ADMIN — SODIUM CHLORIDE 1000 ML: 900 INJECTION, SOLUTION INTRAVENOUS at 18:39

## 2018-12-26 RX ADMIN — SODIUM CHLORIDE 1000 ML: 900 INJECTION, SOLUTION INTRAVENOUS at 19:45

## 2018-12-26 ASSESSMENT — ENCOUNTER SYMPTOMS
NAUSEA: 0
FEVER: 0
VOMITING: 0
LIGHT-HEADEDNESS: 1
DIARRHEA: 0
NECK STIFFNESS: 1

## 2018-12-26 ASSESSMENT — MIFFLIN-ST. JEOR: SCORE: 2161.94

## 2018-12-26 NOTE — ED AVS SNAPSHOT
[facility], [place], Emergency Department  [address] [address]  Phone:  [phone]                                    Westley Kang   MRN: [id number]    Department:  [facility], Emergency Department   Date of Visit:  12/26/2018           After Visit Summary Signature Page    I have received my discharge instructions, and my questions have been answered. I have discussed any challenges I see with this plan with the nurse or doctor.    ..........................................................................................................................................  Patient/Patient Representative Signature      ..........................................................................................................................................  Patient Representative Print Name and Relationship to Patient    ..................................................               ................................................  Date                                   Time    ..........................................................................................................................................  Reviewed by Signature/Title    ...................................................              ..............................................  Date                                               Time          22EPIC Rev 08/18

## 2018-12-26 NOTE — ED PROVIDER NOTES
History     Chief Complaint   Patient presents with     Neck Pain     some numbness/tingling in neck from last night     Chest Pain     HPI  Westley Kang is a 28 year old male who presents to the Emergency Department for evaluation of neck and chest pain. Patient reports having intermittent left-sided chest pain last night during dinner along with constant light headedness that lasted for approximately a hour before subsiding. This morning he awoke with neck stiffness and what he describes as occipital swelling and posterior neck numbness. Patient reports that this persists here in the ED. He reports that the chest pain is resolved now and states that episodes last night would last about 15 seconds with about 30 minutes in between. He denies any fevers, nausea, vomiting, or diarrhea. He reports he slept in his normal bed last night and denies any associated injuries or trauma. He did have a glass of wine with dinner last night but denies excessive use of alcohol. He took one ibuprofen this morning with minimal relief. He denies past history of anxiety or other medical issues. Of note, patient notes that his symptoms last night was similar to a previous episode about a month ago for which he was seen in the ED on 11/3/2018. At the time, he presented lightheadedness, blurry vision, followed by left-sided facial numbness. His work up included an EKG with sinus tachycardia, negative troponin a negative d-dimer, TSH within normal limits, and LFTs mildly elevated. Patient did follow-up in clinic to have a cardiac monitor placed for seven days which was unremarkable.     I have reviewed the Medications, Allergies, Past Medical and Surgical History, and Social History in the [facility] system.    Past Medical History:   Diagnosis Date     Hypertension        Past Surgical History:   Procedure Laterality Date     GI SURGERY  1989    Chris Fundiplication     ORTHOPEDIC SURGERY      Right elbow       Family History   Problem  "Relation Age of Onset     Lymphoma Mother      Coronary Artery Disease Father      Hypertension Father      Diabetes Father      Hypertension Brother        Social History     Tobacco Use     Smoking status: Never Smoker     Smokeless tobacco: Never Used   Substance Use Topics     Alcohol use: Yes     Alcohol/week: 3.0 oz     Types: 5 Glasses of wine per week     Comment: daily     Current Facility-Administered Medications   Medication     sodium chloride 0.9% infusion     No current outpatient medications on file.      No Known Allergies    Review of Systems   Constitutional: Negative for fever.   Cardiovascular: Positive for chest pain.   Gastrointestinal: Negative for diarrhea, nausea and vomiting.   Musculoskeletal: Positive for neck stiffness.   Neurological: Positive for light-headedness.   All other systems reviewed and are negative.      Physical Exam   BP: (!) 175/95  Pulse: 116  Temp: 97.1  F (36.2  C)  Resp: 18  Height: 190.5 cm (6' 3\")  Weight: 111.1 kg (245 lb)  SpO2: 97 %      Physical Exam   Constitutional: No distress.   HENT:   Head: Atraumatic.   Mouth/Throat: Oropharynx is clear and moist. No oropharyngeal exudate.   Eyes: Pupils are equal, round, and reactive to light. No scleral icterus.   Cardiovascular: Normal heart sounds.   Tachycardic   Pulmonary/Chest: Breath sounds normal. No respiratory distress.   Abdominal: Soft. He exhibits no distension. There is no tenderness.   Musculoskeletal: He exhibits no edema or tenderness.   Upper he is thoracic back tenderness at base of neck.  Rotation of the neck to the right somewhat increases back pain   Neurological: He is alert.   Skin: Skin is warm. He is not diaphoretic.   Psychiatric: He has a normal mood and affect. His behavior is normal.       ED Course        Procedures          Labs Ordered and Resulted from Time of ED Arrival Up to the Time of Departure from the ED   CBC WITH PLATELETS DIFFERENTIAL   COMPREHENSIVE METABOLIC PANEL   LIPASE "   TROPONIN I   ALCOHOL ETHYL          EKG Interpretation:      Interpreted by Beto Whalen  Time reviewed:1920   Symptoms at time of EKG: none  Rhythm: normal sinus   Rate: 107 Axis: NORMAL  Ectopy: none  Conduction: normal  ST Segments/ T Waves: No ST-T wave changes  Q Waves: none  Comparison to prior: Unchanged    Clinical Impression: Sinus tachycardia      Results for orders placed or performed during the hospital encounter of 12/26/18   CBC with platelets differential   Result Value Ref Range    WBC 9.6 4.0 - 11.0 10e9/L    RBC Count 5.14 4.4 - 5.9 10e12/L    Hemoglobin 15.4 13.3 - 17.7 g/dL    Hematocrit 46.5 40.0 - 53.0 %    MCV 91 78 - 100 fl    MCH 30.0 26.5 - 33.0 pg    MCHC 33.1 31.5 - 36.5 g/dL    RDW 13.1 10.0 - 15.0 %    Platelet Count 283 150 - 450 10e9/L    Diff Method Automated Method     % Neutrophils 67.2 %    % Lymphocytes 24.3 %    % Monocytes 5.3 %    % Eosinophils 2.5 %    % Basophils 0.3 %    % Immature Granulocytes 0.4 %    Nucleated RBCs 0 0 /100    Absolute Neutrophil 6.4 1.6 - 8.3 10e9/L    Absolute Lymphocytes 2.3 0.8 - 5.3 10e9/L    Absolute Monocytes 0.5 0.0 - 1.3 10e9/L    Absolute Eosinophils 0.2 0.0 - 0.7 10e9/L    Absolute Basophils 0.0 0.0 - 0.2 10e9/L    Abs Immature Granulocytes 0.0 0 - 0.4 10e9/L    Absolute Nucleated RBC 0.0    Comprehensive metabolic panel   Result Value Ref Range    Sodium 138 133 - 144 mmol/L    Potassium 3.9 3.4 - 5.3 mmol/L    Chloride 105 94 - 109 mmol/L    Carbon Dioxide 26 20 - 32 mmol/L    Anion Gap 7 3 - 14 mmol/L    Glucose 88 70 - 99 mg/dL    Urea Nitrogen 13 7 - 30 mg/dL    Creatinine 0.70 0.66 - 1.25 mg/dL    GFR Estimate >90 >60 mL/min/[1.73_m2]    GFR Estimate If Black >90 >60 mL/min/[1.73_m2]    Calcium 9.0 8.5 - 10.1 mg/dL    Bilirubin Total 0.4 0.2 - 1.3 mg/dL    Albumin 4.4 3.4 - 5.0 g/dL    Protein Total 8.5 6.8 - 8.8 g/dL    Alkaline Phosphatase 82 40 - 150 U/L    ALT 40 0 - 70 U/L    AST 15 0 - 45 U/L   Lipase   Result Value Ref  Range    Lipase 96 73 - 393 U/L   Troponin I   Result Value Ref Range    Troponin I ES <0.015 0.000 - 0.045 ug/L   Alcohol ethyl   Result Value Ref Range    Ethanol g/dL <0.01 <0.01 g/dL   EKG 12 lead   Result Value Ref Range    Interpretation ECG Click View Image link to view waveform and result          Assessments & Plan (with Medical Decision Making)   28-year-old male presents with a chief complaint of chest pain and neck stiffness.  He is afebrile no nausea or vomiting.  No current chest pain.  His symptoms were predominantly last night.  He was previously evaluated this and had a event monitor placed.  He had not previously heard the results of but he was told by myself that they were unremarkable.  His labs in the emergency department unremarkable.  He was initially tachycardic but this improved somewhat with IV fluids.  He has good range of motion of his neck and a slight increase in his right upper back pain with right-sided rotation.  This I suspect is muscular skeletal in nature.  In terms of his chest issues I recommend he follow-up with his primary care provider.  He has no current palpitations or chest pain.  Patient understands the plan and will return to us as needed.    I have reviewed the nursing notes.    I have reviewed the findings, diagnosis, plan and need for follow up with the patient.       Medication List      There are no discharge medications for this visit.         Final diagnoses:   Neck pain   Chest pain, unspecified type     I, Mare Jones, am serving as a trained medical scribe to document services personally performed by Beto Whalen DO, based on the provider's statements to me.   I, Beto Whalen DO, was physically present and have reviewed and verified the accuracy of this note documented by Mare Jones.    12/26/2018   [facility], [place], EMERGENCY DEPARTMENT     Beto Whalen DO  12/26/18 2030       Beto Whalen DO  12/26/18 2036

## 2018-12-26 NOTE — ED TRIAGE NOTES
Pt arrived in triage with concerns of neck pain/numbness tingling since last night. Pt also states that he had chest palpitations last night, but since then has gone away. Pt says he came here a month ago for similiar symptoms and had to wear a heart monitor for a month. VSS. Hx of hypertension /95 in triage.

## 2018-12-27 NOTE — DISCHARGE INSTRUCTIONS
Follow-up with your primary care provider.  Return to the emergency department for any new or worsening symptoms as needed.

## 2020-03-11 ENCOUNTER — HEALTH MAINTENANCE LETTER (OUTPATIENT)
Age: 30
End: 2020-03-11

## 2020-12-17 ENCOUNTER — OFFICE VISIT (OUTPATIENT)
Dept: URGENT CARE | Facility: URGENT CARE | Age: 30
End: 2020-12-17
Payer: COMMERCIAL

## 2020-12-17 VITALS
DIASTOLIC BLOOD PRESSURE: 80 MMHG | HEART RATE: 108 BPM | SYSTOLIC BLOOD PRESSURE: 130 MMHG | BODY MASS INDEX: 29.83 KG/M2 | TEMPERATURE: 98.5 F | OXYGEN SATURATION: 99 % | HEIGHT: 76 IN | WEIGHT: 245 LBS

## 2020-12-17 DIAGNOSIS — M54.2 NECK PAIN: Primary | ICD-10-CM

## 2020-12-17 PROCEDURE — 99213 OFFICE O/P EST LOW 20 MIN: CPT | Performed by: STUDENT IN AN ORGANIZED HEALTH CARE EDUCATION/TRAINING PROGRAM

## 2020-12-17 RX ORDER — IBUPROFEN 200 MG
200 TABLET ORAL EVERY 4 HOURS PRN
COMMUNITY

## 2020-12-17 ASSESSMENT — MIFFLIN-ST. JEOR: SCORE: 2167.81

## 2020-12-17 NOTE — PROGRESS NOTES
"Subjective     Westley Kang is a 30 year old male who presents to clinic today for the following health issues:    HPI   Westley is a 30 yr old male w/o pmh here with 3-4 days of small nodule on right side of throat. He reports that he was lifting weights strenuously the other day prior to noticing some soreness in his right neck. After pushing on the area he noticed a small nodule on the right side of his neck. Denies anne pain, however notices it when he swallows. Denies trouble breathing or trouble swallowing food or food getting stuck in throat. He denies diarrhea, hair loss, sweating or worsening anxiety. He reports wt loss over past couple of months, although intentional. Denies fevers. He reports no other lumps or bumps he is worried about.       Review of Systems    ROS: 10 point ROS neg other than the symptoms noted above in the HPI.      Objective    /80   Pulse 108   Temp 98.5  F (36.9  C) (Oral)   Ht 1.93 m (6' 4\")   Wt 111.1 kg (245 lb)   SpO2 99%   BMI 29.82 kg/m    Body mass index is 29.82 kg/m .  Physical Exam   General Appearance: Non toxic, NAD  Eyes: no scleral injections, EOMI, PERRLA  HEENT: NCAT, nares patent, MMM, no oropharynx erythema, no oral lesions or petechiae.  Neck: No JVD, ROM intact, no nuchal rigidity, thyroid w/o ttp w/o enlargement, small mass lateral to the right side of neck.    Respiratory: CTAB, no work of breathing, no crackles, wheezes, ronchi  Cardiovascular: RRR,normal S1, S2, no murmur, extremities wwp, cap refill < 2 secs   GI: soft, no distention, no ttp, no organomegaly, normoactive bs, no peritoneal signs    Lymph/Hematologic: no cervical LAD, no generalized LAD, no bruises   Skin: no rashes, lesions   Musculoskeletal: no peripheral edema, no jnt ttp or effusions    Neurologic: A&OX3, CN 2-10 intact, no focal decifict, strength, sensation intact   Psychiatric: appropriate mood          Assessment & Plan     #Neck Pain   #<1cm neck mass   Suspect pain is " 2/2 to strained muscle from weight lifting and the small nodule was found haphazardly and feels like a lymph node without other active nodes. Other consideration would be a thyroid nodule although this does not move when he swallows. Also he has no clinical evidence of hyperthyroidism.     Discussed following the small nodule conservatively with self monitoring on a weekly basis. If still present in 1 month, he should establish care with a primary care doctor here and obtain repeat examination and consideration of evaluation with TSH.     Kimberly Espino MD  [facility]

## 2020-12-17 NOTE — PATIENT INSTRUCTIONS
If notice the small lump in neck after one month, please make an appointment with primary care doctor to obtain a TSH(thyroid) lab.     Okay to take ibuprofen as needed for soreness.   Patient Education     Common Thyroid Problems    The thyroid is a gland in the neck. It's controlled by the pituitary gland in the brain. The thyroid makes 2 hormones that control how the body uses and stores energy. If there's a problem with the thyroid, the level of thyroid hormones may change. This can lead to symptoms. Thyroid problems can be treated.   Hypothyroidism  This is when the thyroid gland doesn t make enough hormone. The most common cause of hypothyroidism is Hashimoto thyroiditis. This occurs when the body s immune system mistakenly attacks the thyroid gland. This damages the thyroid so it doesn't make enough hormones. Hypothyroidism may also occur if there s a severe deficiency or an excess of iodine in the body. The thyroid needs iodine to make hormone. Problems with the pituitary gland can lead to not enough production of thyroid hormone. Some medicines can cause hypothyroidism. Hypothyroidism will also occur if the thyroid gland is removed during surgery. Hypothyroidism, can also develop after pregnancy.   Common symptoms include:    Low energy and tiredness    Depression    Feeling cold    Muscle pain    Slowed thinking        Constipation    Heavier menstrual periods with prolonged bleeding     Weight gain    Dry and brittle skin, hair, nails    Excessive sleepiness  Hyperthyroidism  This is when the thyroid gland makes too much hormone. The most common cause is Graves disease. It occurs when the body s immune system mistakenly tells the thyroid to make too much hormone. Another cause is a small bump (nodule) in the thyroid gland. This can cause hyperthyroidism if the cells in the nodule make too much thyroid hormone and stop hormone production in the rest of the thyroid gland.   Common symptoms  include:    Shaking, nervousness, irritability    Feeling hot    A rapid or irregular heartbeat    Muscle weakness, fatigue    Tremors in the hands    More frequent bowel movements    [place], lighter. or irregular menstrual periods    Weight loss    Hair loss    Bulging eyes  Nodules  Nodules are lumps of tissue in the thyroid gland. Most often, the cause of nodules isn t known. But they may be more common in people who ve had treatment with radiation to the head or neck. Sometimes nodules can be felt on the outside of the neck. Most of the time, the nodules cause no symptoms and don t affect the amount of thyroid hormone. Most nodules are not cancer. But sometimes a nodule may be cancer. Risk factors for cancer include age, gender, family history of thyroid cancer, and history of radiation exposure.   What is a goiter?  A goiter is the enlargement of the thyroid gland. When the gland enlarges, you may see or feel a swelling on your neck. A goiter can develop in someone with a normal thyroid, overactive thyroid, or underactive thyroid.   [employer] last reviewed this educational content on 6/1/2019 2000-2020 [email or web address]. [address] [place] [address], [place] [address]. All rights reserved. This information is not intended as a substitute for professional medical care. Always follow your healthcare professional's instructions.

## 2021-04-25 ENCOUNTER — HEALTH MAINTENANCE LETTER (OUTPATIENT)
Age: 31
End: 2021-04-25

## 2021-10-10 ENCOUNTER — HEALTH MAINTENANCE LETTER (OUTPATIENT)
Age: 31
End: 2021-10-10

## 2022-05-21 ENCOUNTER — HEALTH MAINTENANCE LETTER (OUTPATIENT)
Age: 32
End: 2022-05-21

## 2022-09-18 ENCOUNTER — HEALTH MAINTENANCE LETTER (OUTPATIENT)
Age: 32
End: 2022-09-18

## 2023-06-04 ENCOUNTER — HEALTH MAINTENANCE LETTER (OUTPATIENT)
Age: 33
End: 2023-06-04